# Patient Record
Sex: FEMALE | Race: WHITE | Employment: UNEMPLOYED | URBAN - METROPOLITAN AREA
[De-identification: names, ages, dates, MRNs, and addresses within clinical notes are randomized per-mention and may not be internally consistent; named-entity substitution may affect disease eponyms.]

---

## 2024-06-22 ENCOUNTER — HOSPITAL ENCOUNTER (EMERGENCY)
Age: 39
Discharge: HOME | End: 2024-06-22

## 2024-06-22 VITALS
RESPIRATION RATE: 20 BRPM | TEMPERATURE: 98.5 F | HEART RATE: 125 BPM | DIASTOLIC BLOOD PRESSURE: 114 MMHG | OXYGEN SATURATION: 98 % | HEIGHT: 59 IN | WEIGHT: 175 LBS | SYSTOLIC BLOOD PRESSURE: 236 MMHG | BODY MASS INDEX: 35.28 KG/M2

## 2024-06-22 DIAGNOSIS — Z51.89 VISIT FOR WOUND CHECK: ICD-10-CM
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
## 2024-06-22 DIAGNOSIS — S61.452A DOG BITE OF LEFT HAND, INITIAL ENCOUNTER: Primary | ICD-10-CM

## 2024-06-22 PROCEDURE — 99283 EMERGENCY DEPT VISIT LOW MDM: CPT

## 2024-06-22 ASSESSMENT — LIFESTYLE VARIABLES: HOW OFTEN DO YOU HAVE A DRINK CONTAINING ALCOHOL: MONTHLY OR LESS

## 2024-06-22 ASSESSMENT — PAIN - FUNCTIONAL ASSESSMENT: PAIN_FUNCTIONAL_ASSESSMENT: NONE - DENIES PAIN

## 2024-06-22 NOTE — ED PROVIDER NOTES
Emergency Department Provider Note       PCP: No primary care provider on file.   Age: 39 y.o.   Sex: female     DISPOSITION Decision To Discharge 06/22/2024 11:56:00 AM       ICD-10-CM    1. Dog bite of left hand, initial encounter  S61.452A [facility] (Hand Surgery)    W54.0XXA       2. Visit for wound check  Z51.89 [facility] (Hand Surgery)          Medical Decision Making     39-year-old female presenting to the emergency department today for evaluation of wounds to the left hand secondary to a dog bite that occurred 10 days ago, repaired at an outside facility.  The wounds unfortunately have not healed appropriately, likely due to tissue being capped moist as she has kept the hand wrapped pretty much at all times but has been changing the bandages.  There are no signs of secondary infection at this time.  She does have intact range of motion and normal sensation to all digits.  One of the sutures in the webspace between the index and middle fingers has pulled through one of the wound edges so this was removed however the remaining 4 sutures in the hand were left in place and patient advised to keep the wounds open to air to allow them to dry.  Sutures will need to be removed in 2 days.  She is in agreement with plan.  I will also refer her to her hand surgery for follow-up as this is her dominant hand.  Advised against putting any additional creams, antiseptics, or alcohol over the wounds as this will delay the healing process as well.  Her tetanus was updated at outside facility and she received rabies postexposure prophylaxis.     1 acute, uncomplicated illness or injury.  Prescription drug management performed.  Shared medical decision making was utilized in creating the patients health plan today.    I independently ordered and reviewed each unique test.  I reviewed external records: ED visit note from an outside group.                   History     39-year-old female  who presenting to the emergency department today after a dog bite to the left hand that occurred about 10 days ago initially.  She states that she was walking her dog when a [place] tried to attack her dog and when she tried to break up the fight she was bit on the left hand and the right forearm.  She was seen at an outside ER following the injury.  She states that she had sutures placed in her left hand, given rabies prophylaxis and her tetanus was updated.  She completed Augmentin.  She is here today as she is concerned that the wounds are not healing appropriately.  She states that she has pretty much been keeping the hand wrapped with nonadherent dressings and Coban since then.  She states that she did have some swelling yesterday [facility] told her she should come back to the ER for reevaluation.  She states that she did keep it open to air last night and it seemed to help with the swelling.  She denies numbness, tingling or decreased range of motion in her hand.  She is left-hand dominant.  She has not had any fevers and has not noticed any drainage from the wounds.    The history is provided by the patient. No  was used.     Physical Exam     Vitals signs and nursing note reviewed:  Vitals:    06/22/24 1107   BP: (!) 236/114   Pulse: (!) 125   Resp: 20   Temp: 98.5 °F (36.9 °C)   TempSrc: Oral   SpO2: 98%   Weight: 79.4 kg (175 lb)   Height: 1.499 m (4' 11\")      Physical Exam  Vitals and nursing note reviewed.   Constitutional:       General: She is not in acute distress.     Appearance: Normal appearance.   HENT:      Head: Normocephalic and atraumatic.      Nose: Nose normal.   Eyes:      Conjunctiva/sclera: Conjunctivae normal.   Musculoskeletal:        Hands:       Comments: #1Laceration to the webspace between the index and middle fingers of the left hand.  2 sutures are in place however the most medial one has pulled through 1 side of the

## 2024-06-22 NOTE — ED TRIAGE NOTES
Patient was attacked by a dog and was seen at [facility] last Wednesday  had stitches put in her left hand. There is an open part where no stitches were put in and is not healing correctly.     Patient states that she is very anxious about being in an emergency room again and she struggles with anxiety.

## 2024-06-22 NOTE — DISCHARGE INSTRUCTIONS
As discussed you need to keep the wounds open to air to allow them to dry.  We did not remove your sutures today as I am concerned that the wound may open up, leave these in place for 2 more days and return for suture removal.    Keep the wounds dry, do not apply any additional sort of cleansers, creams, alcohol or anything else to the wounds.  Try to prevent the index and middle fingers from  as this will pull on the sutures that are already in place in this area.    The good news is that there does not appear to be any signs of secondary infection at this time.    I have placed a referral for you to follow-up with hand surgery.    Return to the ER for any new or concerning symptoms.

## 2024-06-25 ENCOUNTER — HOSPITAL ENCOUNTER (EMERGENCY)
Age: 39
Discharge: HOME | End: 2024-06-25

## 2024-06-25 VITALS
RESPIRATION RATE: 16 BRPM | HEART RATE: 109 BPM | SYSTOLIC BLOOD PRESSURE: 117 MMHG | TEMPERATURE: 98.6 F | DIASTOLIC BLOOD PRESSURE: 82 MMHG | OXYGEN SATURATION: 98 %

## 2024-06-25 DIAGNOSIS — Z48.02 ENCOUNTER FOR REMOVAL OF SUTURES: Primary | ICD-10-CM

## 2024-06-25 ASSESSMENT — LIFESTYLE VARIABLES
HOW MANY STANDARD DRINKS CONTAINING ALCOHOL DO YOU HAVE ON A TYPICAL DAY: PATIENT DOES NOT DRINK
HOW OFTEN DO YOU HAVE A DRINK CONTAINING ALCOHOL: NEVER

## 2024-06-25 NOTE — ED NOTES
Pt left prior to d/c vitals and AVS review. Attempted to call pt to give verbal instructions but unable to reach pt.

## 2024-06-25 NOTE — ED PROVIDER NOTES
Emergency Department Provider Note       PCP: No primary care provider on file.   Age: 39 y.o.   Sex: female     DISPOSITION     dc    ICD-10-CM    1. Encounter for removal of sutures  Z48.02           Medical Decision Making     As in HPI.  Patient presents to the ED for suture removal, 13 days after sutures placed following dog bite wounds.  Symptoms are improved she has no pain.  No fevers or chills.  No bleeding or drainage.  No numbness tingling or weakness.  Exam is reassuring she is neurovascularly intact, there is no no fluctuance or erythema.  Appropriate wound healing noted.  I have low clinical suspicion at this time for fracture or foreign body, cellulitis or abscess, tenosynovitis or other acute emergent process.  She does not wish to have any workup or imaging.  Would like to have it removed.     Tachycardic at arrival, she self-reports. \"I feel very anxious being here.\"  I suspect tachycardia is related to her anxiety and we will recheck.  She has a reassuring exam.  Sutures removed as a procedure note.  Patient tolerates well.  No wound separation, no fluctuance, no drainage, no decreased range of motion.  Has been referred to hand specialist for follow-up and I have recommended she call tomorrow to facilitate scheduling this appointment.    Patient is well-hydrated appearing, no distress.  Nontoxic-appearing, tolerating oral intake, hemodynamically stable. All findings and plan were discussed with the patient. All questions answered. Discussed with the patient that an unremarkable evaluation in the ED does not preclude the development or presence of a serious or life threatening condition. Patient was instructed to return immediately for any worsening or change in current symptoms, or if symptoms do not continue to improve. I instructed them to follow up with their primary care provider, own specialist, or medical provider that I am recommending for him within the next 2-3 days  The patient